# Patient Record
Sex: MALE | Race: WHITE | NOT HISPANIC OR LATINO | Employment: FULL TIME | URBAN - METROPOLITAN AREA
[De-identification: names, ages, dates, MRNs, and addresses within clinical notes are randomized per-mention and may not be internally consistent; named-entity substitution may affect disease eponyms.]

---

## 2021-07-13 ENCOUNTER — OFFICE VISIT (OUTPATIENT)
Dept: URGENT CARE | Facility: CLINIC | Age: 26
End: 2021-07-13
Payer: OTHER GOVERNMENT

## 2021-07-13 VITALS
RESPIRATION RATE: 14 BRPM | TEMPERATURE: 99 F | HEIGHT: 73 IN | DIASTOLIC BLOOD PRESSURE: 82 MMHG | BODY MASS INDEX: 25.18 KG/M2 | WEIGHT: 190 LBS | SYSTOLIC BLOOD PRESSURE: 132 MMHG | HEART RATE: 59 BPM | OXYGEN SATURATION: 97 %

## 2021-07-13 DIAGNOSIS — J02.9 SORE THROAT: Primary | ICD-10-CM

## 2021-07-13 DIAGNOSIS — J02.9 ACUTE VIRAL PHARYNGITIS: ICD-10-CM

## 2021-07-13 PROBLEM — N41.1 CHRONIC PROSTATITIS: Status: ACTIVE | Noted: 2020-10-12

## 2021-07-13 LAB
CTP QC/QA: YES
HETEROPH AB SER QL: NEGATIVE
MOLECULAR STREP A: NEGATIVE
SARS-COV-2 RDRP RESP QL NAA+PROBE: NEGATIVE

## 2021-07-13 PROCEDURE — U0002: ICD-10-PCS | Mod: QW,S$GLB,, | Performed by: FAMILY MEDICINE

## 2021-07-13 PROCEDURE — 87651 STREP A DNA AMP PROBE: CPT | Mod: QW,S$GLB,, | Performed by: FAMILY MEDICINE

## 2021-07-13 PROCEDURE — 86308 HETEROPHILE ANTIBODY SCREEN: CPT | Mod: QW,S$GLB,, | Performed by: FAMILY MEDICINE

## 2021-07-13 PROCEDURE — 99203 OFFICE O/P NEW LOW 30 MIN: CPT | Mod: S$GLB,,, | Performed by: FAMILY MEDICINE

## 2021-07-13 PROCEDURE — 87651 POCT STREP A MOLECULAR: ICD-10-PCS | Mod: QW,S$GLB,, | Performed by: FAMILY MEDICINE

## 2021-07-13 PROCEDURE — 86308 POCT INFECTIOUS MONONUCLEOSIS: ICD-10-PCS | Mod: QW,S$GLB,, | Performed by: FAMILY MEDICINE

## 2021-07-13 PROCEDURE — U0002 COVID-19 LAB TEST NON-CDC: HCPCS | Mod: QW,S$GLB,, | Performed by: FAMILY MEDICINE

## 2021-07-13 PROCEDURE — 99203 PR OFFICE/OUTPT VISIT, NEW, LEVL III, 30-44 MIN: ICD-10-PCS | Mod: S$GLB,,, | Performed by: FAMILY MEDICINE

## 2024-10-12 ENCOUNTER — HOSPITAL ENCOUNTER (EMERGENCY)
Facility: HOSPITAL | Age: 29
Discharge: HOME | End: 2024-10-12
Attending: EMERGENCY MEDICINE
Payer: OTHER GOVERNMENT

## 2024-10-12 VITALS
BODY MASS INDEX: 23.06 KG/M2 | HEIGHT: 73 IN | TEMPERATURE: 98 F | OXYGEN SATURATION: 96 % | SYSTOLIC BLOOD PRESSURE: 127 MMHG | HEART RATE: 99 BPM | DIASTOLIC BLOOD PRESSURE: 79 MMHG | RESPIRATION RATE: 18 BRPM | WEIGHT: 174 LBS

## 2024-10-12 DIAGNOSIS — M79.641 RIGHT HAND PAIN: Primary | ICD-10-CM

## 2024-10-12 PROCEDURE — 99283 EMERGENCY DEPT VISIT LOW MDM: CPT | Mod: 25

## 2024-10-12 PROCEDURE — 25000003 PHARM REV CODE 250

## 2024-10-12 RX ORDER — IBUPROFEN 400 MG/1
800 TABLET ORAL
Status: COMPLETED | OUTPATIENT
Start: 2024-10-12 | End: 2024-10-12

## 2024-10-12 RX ORDER — ACETAMINOPHEN 500 MG
1000 TABLET ORAL EVERY 6 HOURS PRN
Qty: 28 TABLET | Refills: 0 | Status: SHIPPED | OUTPATIENT
Start: 2024-10-12

## 2024-10-12 RX ORDER — IBUPROFEN 800 MG/1
800 TABLET ORAL EVERY 6 HOURS PRN
Qty: 20 TABLET | Refills: 0 | Status: SHIPPED | OUTPATIENT
Start: 2024-10-12

## 2024-10-12 RX ADMIN — IBUPROFEN 800 MG: 400 TABLET ORAL at 07:10

## 2024-10-13 NOTE — ED PROVIDER NOTES
Encounter Date: 10/12/2024    SCRIBE #1 NOTE: I, Mary Cooney, am scribing for, and in the presence of,  Tom Victoria PA-C. I have scribed the following portions of the note - Other sections scribed: HPI/ROS.       History     Chief Complaint   Patient presents with    Hand Pain     Patient reports hurting right thumb punching a bag LROM to joint      Jimmy Camarillo is a 28 y.o. male, with no pertinent PMHx, who presents to the ED s/p right thumb injury. Patient reports punching a punching machine wrong and notes pain in his right thumb most severe in his knuckle. He notes pain with movement of joint. Denies pain in other fingers or wrist.      The history is provided by the patient. No  was used.     Review of patient's allergies indicates:  No Known Allergies  History reviewed. No pertinent past medical history.  Past Surgical History:   Procedure Laterality Date    WISDOM TOOTH EXTRACTION       Family History   Problem Relation Name Age of Onset    No Known Problems Mother      No Known Problems Father       Social History     Tobacco Use    Smoking status: Never    Smokeless tobacco: Never     Review of Systems   Constitutional:  Negative for diaphoresis, fatigue and unexpected weight change.   HENT:  Negative for sinus pain and sore throat.    Eyes:  Negative for pain, redness and visual disturbance.   Respiratory:  Negative for cough, chest tightness, shortness of breath and wheezing.    Cardiovascular:  Negative for chest pain and palpitations.   Gastrointestinal:  Negative for abdominal pain, blood in stool, diarrhea, nausea and vomiting.   Endocrine: Negative for polydipsia, polyphagia and polyuria.   Genitourinary:  Negative for dysuria, frequency and urgency.   Musculoskeletal:  Positive for arthralgias (Right thumb). Negative for back pain and myalgias.   Skin:  Negative for rash.   Allergic/Immunologic: Negative for environmental allergies.   Neurological:  Negative for  dizziness, syncope and headaches.   Psychiatric/Behavioral:  Negative for suicidal ideas.        Physical Exam     Initial Vitals [10/12/24 1846]   BP Pulse Resp Temp SpO2   127/79 99 18 97.8 °F (36.6 °C) 96 %      MAP       --         Physical Exam    Nursing note and vitals reviewed.  Constitutional: Vital signs are normal. He appears well-developed and well-nourished. He is cooperative. He does not appear ill. No distress.   Well-appearing.  No acute distress.   HENT:   Head: Normocephalic and atraumatic.   Right Ear: External ear normal.   Left Ear: External ear normal.   Nose: Nose normal.   Eyes: Conjunctivae and EOM are normal.   Neck: Phonation normal.   Normal range of motion.  Cardiovascular:  Normal rate and regular rhythm.           No murmur heard.  Pulmonary/Chest: Effort normal. No respiratory distress.   Abdominal: Abdomen is flat. He exhibits no distension. There is no abdominal tenderness.   Musculoskeletal:      Cervical back: Normal range of motion.      Comments: Full range of motion of the right wrist.  Full range of motion of the 2nd through 4th fingers of the right hand.  Right thumb with mildly diminished flexion of the MCP joint.  Patient does still have some range of motion and strength on flexion and extension of this joint even against resistance.  Interphalangeal joint of the thumb with good strength on flexion and extension against resistance.  Neurovascularly intact distal.  Sensation is intact and capillary refill is less than 2 seconds.     Neurological: He is alert and oriented to person, place, and time. GCS eye subscore is 4. GCS verbal subscore is 5. GCS motor subscore is 6.   Skin: Skin is warm and dry. Capillary refill takes less than 2 seconds. No rash noted.         ED Course   Procedures  Labs Reviewed - No data to display       Imaging Results              X-Ray Hand 3 view Right (Final result)  Result time 10/12/24 19:36:06      Final result by Renny Hector MD  (10/12/24 19:36:06)                   Impression:      1. No acute displaced fracture or dislocation of the hand.      Electronically signed by: Renny Hector MD  Date:    10/12/2024  Time:    19:36               Narrative:    EXAMINATION:  XR HAND COMPLETE 3 VIEW RIGHT    CLINICAL HISTORY:  thumb injury right;    TECHNIQUE:  PA, lateral, and oblique views of the right hand were performed.    COMPARISON:  None    FINDINGS:  Three views right hand.    No acute displaced fracture or dislocation of the hand.  No radiopaque foreign body.  No significant edema.                                       Medications   ibuprofen tablet 800 mg (800 mg Oral Given 10/12/24 1923)     Medical Decision Making  28-year-old male presenting to the emergency department with right thumb pain after punching a punching bag machine.  Decreased range of motion due to pain.  On exam, he was well-appearing and in no acute distress.  Vitals are within normal limits.  No deformity, ecchymosis, erythema.  Patient has some active range of motion and strength against resistance of the right thumb metacarpophalangeal joint and interphalangeal joint.  Neurovascularly intact.      Differential diagnosis includes but is not limited to contusion, strain, sprain, fracture, dislocation, or ligamentous injury of the affected finger or hand.    X-rays negative for any acute fractures or dislocations.  Presentation consistent with contusion or other soft tissue injury of the right thumb.  Acute management in the emergency department with a dose of Motrin.  I will discharge home with similar.  Stable for discharge home to outpatient follow up.    Return precautions were discussed, all patient questions were answered, and the patient was agreeable to the plan of care.  He was discharged home in stable condition and will follow up with his primary care provider or return to the emergency department if his symptoms worsen or do not improve.     Amount and/or  Complexity of Data Reviewed  Radiology: ordered.    Risk  OTC drugs.  Prescription drug management.            Scribe Attestation:   Scribe #1: I performed the above scribed service and the documentation accurately describes the services I performed. I attest to the accuracy of the note.                         I, Tom Victoria PA-C, personally performed the services described in this documentation. All medical record entries made by the scribe were at my direction and in my presence. I have reviewed the chart and agree that the record reflects my personal performance and is accurate and complete.        Clinical Impression:  Final diagnoses:  [M79.641] Right hand pain (Primary)          ED Disposition Condition    Discharge Stable          ED Prescriptions       Medication Sig Dispense Start Date End Date Auth. Provider    ibuprofen (ADVIL,MOTRIN) 800 MG tablet Take 1 tablet (800 mg total) by mouth every 6 (six) hours as needed for Pain. 20 tablet 10/12/2024 -- Tom Victoria PA-C    acetaminophen (TYLENOL) 500 MG tablet Take 2 tablets (1,000 mg total) by mouth every 6 (six) hours as needed. 28 tablet 10/12/2024 -- Tom Victoria PA-C          Follow-up Information       Follow up With Specialties Details Why Contact Info    [facility] -  Schedule an appointment as soon as possible for a visit  As needed, If symptoms worsen [address] [place] [id number]  [phone]               Tom Victoria PA-C  10/12/24 2031

## 2024-10-13 NOTE — DISCHARGE INSTRUCTIONS
